# Patient Record
Sex: FEMALE | Race: ASIAN | NOT HISPANIC OR LATINO
[De-identification: names, ages, dates, MRNs, and addresses within clinical notes are randomized per-mention and may not be internally consistent; named-entity substitution may affect disease eponyms.]

---

## 2022-02-10 PROBLEM — Z00.00 ENCOUNTER FOR PREVENTIVE HEALTH EXAMINATION: Status: ACTIVE | Noted: 2022-02-10

## 2022-03-07 ENCOUNTER — APPOINTMENT (OUTPATIENT)
Dept: UROLOGY | Facility: CLINIC | Age: 52
End: 2022-03-07
Payer: COMMERCIAL

## 2022-03-07 VITALS
OXYGEN SATURATION: 97 % | HEART RATE: 69 BPM | DIASTOLIC BLOOD PRESSURE: 68 MMHG | WEIGHT: 121 LBS | TEMPERATURE: 98.2 F | RESPIRATION RATE: 16 BRPM | HEIGHT: 60 IN | BODY MASS INDEX: 23.75 KG/M2 | SYSTOLIC BLOOD PRESSURE: 113 MMHG

## 2022-03-07 DIAGNOSIS — R31.29 OTHER MICROSCOPIC HEMATURIA: ICD-10-CM

## 2022-03-07 DIAGNOSIS — Z86.018 PERSONAL HISTORY OF OTHER BENIGN NEOPLASM: ICD-10-CM

## 2022-03-07 PROCEDURE — 76775 US EXAM ABDO BACK WALL LIM: CPT

## 2022-03-07 PROCEDURE — 52000 CYSTOURETHROSCOPY: CPT

## 2022-03-07 PROCEDURE — 99204 OFFICE O/P NEW MOD 45 MIN: CPT | Mod: 25

## 2022-03-07 NOTE — HISTORY OF PRESENT ILLNESS
[FreeTextEntry1] : Patient is a 52 yo F who presents for microhematuria.\par \par At baseline, she has urinary frequency, reports q1hr needing to void.\par \par She had recent labs with PCP showing microhematuria.  She states she has been told she has microhematuria before.\par \par States she does not drink much, 1-2 bottles of water per day.  No significant caffeine intake. [Urinary Incontinence] : no urinary incontinence [Urinary Retention] : no urinary retention [Dysuria] : no dysuria [Hematuria - Gross] : no gross hematuria

## 2022-03-07 NOTE — ADDENDUM
[FreeTextEntry1] : Cystoscopy and renal ULT today unremarkable\par On cystoscopy the urine is a bit cloudy - cipro given and urine sent\par Will contact w results\par D/w pt that given unremarkable evaluation - f/u with PCP for yearly UAs.  F/u prn

## 2022-03-07 NOTE — ASSESSMENT
[FreeTextEntry1] : Patient is a 52 yo F who presents urinary frequency, microhematuria.\par \par Discussed with pt the implications of hematuria and need to further evaluate to rule out potentially dangerous or malignant underlying etiologies.  Discussed with pt need for upper tract imaging and cystoscopy.\par Will obtain labs from PCP.\par F/u for cystoscopy.  Pt requests to perform cysto and renal ULT - she states she is leaving to out of state tomorrow and unsure when returning to [place].

## 2022-03-10 ENCOUNTER — APPOINTMENT (OUTPATIENT)
Age: 52
End: 2022-03-10

## 2022-03-10 LAB
APPEARANCE: ABNORMAL
BACTERIA UR CULT: NORMAL
BACTERIA: NEGATIVE
BILIRUBIN URINE: NEGATIVE
BLOOD URINE: NORMAL
COLOR: NORMAL
GLUCOSE QUALITATIVE U: NEGATIVE
HYALINE CASTS: 1 /LPF
KETONES URINE: NEGATIVE
LEUKOCYTE ESTERASE URINE: NEGATIVE
MICROSCOPIC-UA: NORMAL
NITRITE URINE: NEGATIVE
PH URINE: 8.5
PROTEIN URINE: NEGATIVE
RED BLOOD CELLS URINE: 5 /HPF
SPECIFIC GRAVITY URINE: 1.02
SQUAMOUS EPITHELIAL CELLS: 1 /HPF
UROBILINOGEN URINE: NORMAL
WHITE BLOOD CELLS URINE: 0 /HPF

## 2022-04-05 ENCOUNTER — NON-APPOINTMENT (OUTPATIENT)
Age: 52
End: 2022-04-05